# Patient Record
Sex: MALE | Race: BLACK OR AFRICAN AMERICAN | NOT HISPANIC OR LATINO | URBAN - METROPOLITAN AREA
[De-identification: names, ages, dates, MRNs, and addresses within clinical notes are randomized per-mention and may not be internally consistent; named-entity substitution may affect disease eponyms.]

---

## 2021-09-25 ENCOUNTER — APPOINTMENT (OUTPATIENT)
Dept: VACCINE CLINIC | Facility: HOSPITAL | Age: 61
End: 2021-09-25

## 2022-05-25 ENCOUNTER — IMMUNIZATION (OUTPATIENT)
Dept: VACCINE CLINIC | Facility: HOSPITAL | Age: 62
End: 2022-05-25

## 2022-05-25 DIAGNOSIS — Z23 NEED FOR VACCINATION: Primary | ICD-10-CM

## 2022-05-25 PROCEDURE — 0054A HC ADM SARSCV2 30MCG TRS-SUCR BOOSTER: CPT | Performed by: INTERNAL MEDICINE

## 2022-05-25 PROCEDURE — 91305 HC SARSCOV2 VAC 30 MCG TRS-SUCR PFIZER: CPT | Performed by: INTERNAL MEDICINE

## 2023-11-10 ENCOUNTER — HOSPITAL ENCOUNTER (EMERGENCY)
Facility: HOSPITAL | Age: 63
Discharge: HOME | End: 2023-11-10
Attending: EMERGENCY MEDICINE
Payer: COMMERCIAL

## 2023-11-10 VITALS
OXYGEN SATURATION: 95 % | TEMPERATURE: 99.3 F | BODY MASS INDEX: 33.34 KG/M2 | HEIGHT: 68 IN | RESPIRATION RATE: 16 BRPM | WEIGHT: 220 LBS | HEART RATE: 96 BPM

## 2023-11-10 DIAGNOSIS — M54.42 ACUTE LEFT-SIDED LOW BACK PAIN WITH LEFT-SIDED SCIATICA: Primary | ICD-10-CM

## 2023-11-10 PROCEDURE — 25010000002 KETOROLAC TROMETHAMINE PER 15 MG: Performed by: PHYSICIAN ASSISTANT

## 2023-11-10 PROCEDURE — 99283 EMERGENCY DEPT VISIT LOW MDM: CPT

## 2023-11-10 PROCEDURE — 96372 THER/PROPH/DIAG INJ SC/IM: CPT

## 2023-11-10 RX ORDER — METHOCARBAMOL 750 MG/1
750 TABLET, FILM COATED ORAL 3 TIMES DAILY
Qty: 21 TABLET | Refills: 0 | Status: SHIPPED | OUTPATIENT
Start: 2023-11-10

## 2023-11-10 RX ORDER — KETOROLAC TROMETHAMINE 30 MG/ML
30 INJECTION, SOLUTION INTRAMUSCULAR; INTRAVENOUS ONCE
Status: COMPLETED | OUTPATIENT
Start: 2023-11-10 | End: 2023-11-10

## 2023-11-10 RX ORDER — METHYLPREDNISOLONE 4 MG/1
TABLET ORAL
Qty: 1 EACH | Refills: 0 | Status: SHIPPED | OUTPATIENT
Start: 2023-11-10

## 2023-11-10 RX ORDER — ACETAMINOPHEN 500 MG
1000 TABLET ORAL ONCE
Status: COMPLETED | OUTPATIENT
Start: 2023-11-10 | End: 2023-11-10

## 2023-11-10 RX ORDER — LIDOCAINE 4 G/G
1 PATCH TOPICAL ONCE
Status: DISCONTINUED | OUTPATIENT
Start: 2023-11-10 | End: 2023-11-10 | Stop reason: HOSPADM

## 2023-11-10 RX ORDER — LIDOCAINE 50 MG/G
1 PATCH TOPICAL EVERY 24 HOURS
Qty: 15 PATCH | Refills: 0 | Status: SHIPPED | OUTPATIENT
Start: 2023-11-10

## 2023-11-10 RX ADMIN — LIDOCAINE 1 PATCH: 4 PATCH TOPICAL at 04:30

## 2023-11-10 RX ADMIN — ACETAMINOPHEN 1000 MG: 500 TABLET ORAL at 04:30

## 2023-11-10 RX ADMIN — KETOROLAC TROMETHAMINE 30 MG: 30 INJECTION, SOLUTION INTRAMUSCULAR at 04:30

## 2023-11-10 NOTE — ED NOTES
"Pt arrives ambulatory to triage desk via PV.    Pt states \"I have lower back pain on my left side that is coming up from my left thigh. The pain is throbbing and too much to handle I just can't get comfortable.\"    "

## 2023-11-10 NOTE — ED PROVIDER NOTES
Pt presents to the ED c/o acute on chronic left lower back pain rating down the left leg that was causing him to be uncomfortable not able to sleep tonight.  No significant numbness or weakness, no saddle anesthesia.  No recent falls or injuries.     On exam,   General: No acute distress, nontoxic  HEENT: EOMI  Pulm: Symmetric chest rise, nonlabored breathing  CV: Regular rate and rhythm  GI: Nondistended  MSK: No deformity  Skin: Warm, dry  Neuro: Awake, alert, oriented x 4, moving all extremities, no focal deficits  Psych: Calm, cooperative    Vital signs and nursing notes reviewed.           Plan:   ED Course as of 11/10/23 0508   Fri Nov 10, 2023   0505 Temp: 99.3 °F (37.4 °C) [RC]   0506 Heart Rate: 96 [RC]   0506 Resp: 16 [RC]   0506 Patient well-appearing feeling much better after IM Toradol, lidocaine patch.  I think there is clearly a sciatic component to the patient's lower back pain.  Will treat with diclofenac, Medrol, Robaxin, lidocaine patches and close follow-up with the PCP.  Should the pain persist and not improve over the course next week patient may need physical therapy or MRI for further evaluation.  Patient knows to return to the ED with worsening pain, fever, or should he have any further concerns. [RC]      ED Course User Index  [RC] Keny Love III, PA     Plan for symptom control, no red flags on history or exam to warrant emergent labs/imaging.    Patient feeling improved after meds, plan for discharge with supportive care and outpatient follow up. ED return for worsening symptoms as needed.      Attestation:  The [facility] and I have discussed this patient's history, physical exam, and treatment plan.  I have reviewed the documentation and personally had a face to face interaction with the patient. I affirm the documentation and agree with the treatment and plan.  The attached note describes my personal findings.            Rafita Parsons MD  11/10/23 7472

## 2023-11-10 NOTE — ED PROVIDER NOTES
EMERGENCY DEPARTMENT ENCOUNTER    Room Number:  04/04  PCP: Jac Rodrigues      HPI:  Chief Complaint: Lower back pain  A complete HPI/ROS/PMH/PSH/SH/FH are unobtainable due to: Nothing  Context: Addi Damico is a 63 y.o. male who presents to the ED c/o lower back pain radiating into upper left thigh and left lower extremity.  Pain has been ongoing for a few days.  Patient states he done some yard work his parents that could have exacerbated the symptoms.  There was no direct trauma.  He does have a PMH of lower back pain.  The pain is said to start in the lower back and radiate again into the upper portion of the left extremity to roughly the level of the knee.  Below the knee is unaffected.  Patient denies weakness in the left lower extremity, urinary retention, bowel incontinence, unilateral leg swelling, abdominal pain, fever, chills.          PAST MEDICAL HISTORY  Active Ambulatory Problems     Diagnosis Date Noted    No Active Ambulatory Problems     Resolved Ambulatory Problems     Diagnosis Date Noted    No Resolved Ambulatory Problems     No Additional Past Medical History         PAST SURGICAL HISTORY  History reviewed. No pertinent surgical history.      FAMILY HISTORY  History reviewed. No pertinent family history.      SOCIAL HISTORY  Social History     Socioeconomic History    Marital status: Single   Tobacco Use    Smoking status: Never    Smokeless tobacco: Never   Substance and Sexual Activity    Alcohol use: Yes     Comment: Beer everyday    Drug use: Yes     Types: Marijuana         ALLERGIES  Patient has no known allergies.        REVIEW OF SYSTEMS  Review of Systems     All systems reviewed and negative except for those discussed in HPI.       PHYSICAL EXAM  ED Triage Vitals [11/10/23 0344]   Temp Heart Rate Resp BP SpO2   99.3 °F (37.4 °C) 96 16 -- 95 %      Temp src Heart Rate Source Patient Position BP Location FiO2 (%)   Tympanic Monitor -- -- --       Physical Exam      GENERAL: Very  pleasant, nontoxic, does appear uncomfortable  HENT: nares patent  EYES: no scleral icterus  CV: regular rhythm, normal rate, normal S1-S2, no unilateral leg swelling, DP and PT pulses +2 bilaterally  RESPIRATORY: normal effort  ABDOMEN: soft, nontender, no mass noted  MUSCULOSKELETAL: TTP lumbar spine along the left SI region.  Strength remains 5 of 5 bilateral lower extremity  NEURO: alert, moves all extremities, follows commands  PSYCH:  calm, cooperative  SKIN: warm, dry    Vital signs and nursing notes reviewed.          LAB RESULTS  No results found for this or any previous visit (from the past 24 hour(s)).    Ordered the above labs and reviewed the results.        RADIOLOGY  No Radiology Exams Resulted Within Past 24 Hours    Ordered the above noted radiological studies. Reviewed by me in PACS.          PROCEDURES  Procedures          MEDICATIONS GIVEN IN ER  Medications   Lidocaine 4 % 1 patch (1 patch Transdermal Medication Applied 11/10/23 0430)   ketorolac (TORADOL) injection 30 mg (30 mg Intramuscular Given 11/10/23 0430)   acetaminophen (TYLENOL) tablet 1,000 mg (1,000 mg Oral Given 11/10/23 0430)         MEDICAL DECISION MAKING, PROGRESS, and CONSULTS    Discussion below represents my analysis of pertinent findings related to patient's condition, differential diagnosis, treatment plan and final disposition.      Orders placed during this visit:  No orders of the defined types were placed in this encounter.        Additional sources:  - Discussed/obtained information from independent historians: None available  Additional information was obtained to confirm the patient's history.    - External (non-ED) record review: Reviewing the patient's chart 3/16/2015 he had acute anterior wall MI and underwent left heart cath.  At the time the left main was normal, LAD proximal to mid was 100% blocked.  Left circumflex 30% stenosis, RCA 20% stenosis, LVEF 40 to 45%.  Patient underwent aspiration manual  thrombectomy.  He received 250 mg of IV amiodarone for reperfusion arrhythmia.  He received 9 mg of intercoronary Integrilin.  Patient underwent stenting successfully.  Patient was DC'd on 90 mg Brilinta twice daily and 81 mg of ASA daily.  He was also to be on max statin therapy, beta-blocker and ACE inhibitor.  He was to follow-up closely.         - Chronic or social conditions impacting care: None        Differential diagnosis:    Lumbar radiculopathy, DVT, arterial clot, IT band syndrome, meralgia paresthetica, AAA, other musculoskeletal process.  Abdomen is soft and nontender.  DP and PT pulses +2 bilaterally.  There is no unilateral leg swelling.  There is TTP along the left SI joint.  SLR positive at approximately 45 degrees.  This certainly seems musculoskeletal.  We discussed plain films versus pain management and close follow-up.  Given that there was no trauma or injury the latter course was chosen and I agree with this decision.  We will attempt to treat the patient's pain at this time.  We will ensure he follows up closely with his family physician..      Independent interpretation of labs, radiology studies, and discussions with consultants:  ED Course as of 11/10/23 0528   Fri Nov 10, 2023   0505 Temp: 99.3 °F (37.4 °C) [RC]   0506 Heart Rate: 96 [RC]   0506 Resp: 16 [RC]   0506 Patient well-appearing feeling much better after IM Toradol, lidocaine patch.  I think there is clearly a sciatic component to the patient's lower back pain.  Will treat with diclofenac, Medrol, Robaxin, lidocaine patches and close follow-up with the PCP.  Should the pain persist and not improve over the course next week patient may need physical therapy or MRI for further evaluation.  Patient knows to return to the ED with worsening pain, fever, or should he have any further concerns. [RC]      ED Course User Index  [RC] Keny Love III, PA               DIAGNOSIS  Final diagnoses:   Acute left-sided low back pain  with left-sided sciatica         DISPOSITION  DISCHARGE    Patient discharged in stable condition.    Reviewed implications of results, diagnosis, meds, responsibility to follow up, warning signs and symptoms of possible worsening, potential complications and reasons to return to ER.    Patient/Family voiced understanding of above instructions.    Discussed plan for discharge, as there is no emergent indication for admission. Patient referred to primary care provider for BP management due to today's BP. Pt/family is agreeable and understands need for follow up and repeat testing.  Pt is aware that discharge does not mean that nothing is wrong but it indicates no emergency is present that requires admission and they must continue care with follow-up as given below or physician of their choice.     FOLLOW-UP  Jac Rodrigues  [address] [id number] [phone]    Schedule an appointment as soon as possible for a visit in 5 days  For further evaluation and treatment, As needed         Medication List        New Prescriptions      diclofenac 50 MG EC tablet  Commonly known as: VOLTAREN  Take 1 tablet by mouth 3 (Three) Times a Day. Take with food and stop if upset stomach     lidocaine 5 %  Commonly known as: LIDODERM  Place 1 patch on the skin as directed by provider Daily. Remove & Discard patch within 12 hours or as directed by MD     methocarbamol 750 MG tablet  Commonly known as: ROBAXIN  Take 1 tablet by mouth 3 (Three) Times a Day.     methylPREDNISolone 4 MG dose pack  Commonly known as: MEDROL  Take as directed on package instructions.               Where to Get Your Medications        These medications were sent to [facility] [id number] - [place], [place] - [address] AT [facility] [address]  - [phone]  - [phone] [address] [place] [address] [address], [facility] [address]      Phone: [phone]   diclofenac 50 MG EC tablet  lidocaine 5 %  methocarbamol 750 MG  tablet  methylPREDNISolone 4 MG dose pack            Latest Documented Vital Signs:  As of 05:28 EST  BP-   HR- 96 Temp- 99.3 °F (37.4 °C) (Tympanic) O2 sat- 95%      --    Please note that portions of this were completed with a voice recognition program.       Note Disclaimer: At [facility], we believe that sharing information builds trust and better relationships. You are receiving this note because you are receiving care at [facility] or recently visited. It is possible you will see health information before a provider has talked with you about it. This kind of information can be easy to misunderstand. To help you fully understand what it means for your health, we urge you to discuss this note with your provider.         Keny Love III, PA  11/10/23 0505